# Patient Record
Sex: FEMALE | Race: WHITE | URBAN - METROPOLITAN AREA
[De-identification: names, ages, dates, MRNs, and addresses within clinical notes are randomized per-mention and may not be internally consistent; named-entity substitution may affect disease eponyms.]

---

## 2023-04-14 ENCOUNTER — OFFICE VISIT (OUTPATIENT)
Dept: URBAN - METROPOLITAN AREA CLINIC 87 | Facility: CLINIC | Age: 68
End: 2023-04-14
Payer: MEDICARE

## 2023-04-14 DIAGNOSIS — H27.01 APHAKIA, RIGHT EYE: ICD-10-CM

## 2023-04-14 DIAGNOSIS — H59.021 CATARACT (LENS) FRAGMT IN EYE FOL CATARACT SURGERY, R EYE: Primary | ICD-10-CM

## 2023-04-14 PROCEDURE — 99204 OFFICE O/P NEW MOD 45 MIN: CPT | Performed by: OPHTHALMOLOGY

## 2023-04-14 RX ORDER — OFLOXACIN 3 MG/ML
0.3 % SOLUTION/ DROPS OPHTHALMIC
Qty: 5 | Refills: 2 | Status: ACTIVE
Start: 2023-04-14

## 2023-04-14 RX ORDER — PREDNISOLONE ACETATE 10 MG/ML
1 % SUSPENSION/ DROPS OPHTHALMIC
Qty: 5 | Refills: 2 | Status: ACTIVE
Start: 2023-04-14

## 2023-04-14 ASSESSMENT — INTRAOCULAR PRESSURE
OS: 13
OD: 29

## 2023-04-14 NOTE — IMPRESSION/PLAN
Impression: Cataract (lens) fragmt in eye fol cataract surgery, r eye: H59.021. Right. OCT: 
OD: wnl OS: lamellar hole Plan: Examination reveals a retained piece of lens material in the vitreous cavity after cataract surgery. Given the quantity and potential for significant inflammation and elevated intraocular pressure, surgery is recommended. We discussed the risks, benefits, indications, and alternatives to vitrectomy and lensectomy. The risks include, but are not limited to infection, retinal tear or detachment, glaucoma, hemorrhage, loss of eye and blindness, chronic CME, and need for additional surgery. The patient understands that with vitrectomy, the vision can improve, but sometimes does not improve fully and sometimes the vision does not improve at all. Also, it can take months to years for the vision to improve. The patient elects to proceed with surgery. PLAN: 25g PPV, PPL, scleral fixated IOL OD (45 min, within 1 week) -Need original IOL calcs from [facility] for C/ Campos Grimes

## 2023-04-19 ENCOUNTER — POST-OPERATIVE VISIT (OUTPATIENT)
Dept: URBAN - METROPOLITAN AREA CLINIC 7 | Facility: CLINIC | Age: 68
End: 2023-04-19
Payer: MEDICARE

## 2023-04-19 DIAGNOSIS — H59.021 CATARACT (LENS) FRAGMT IN EYE FOL CATARACT SURGERY, R EYE: Primary | ICD-10-CM

## 2023-04-19 PROCEDURE — 99024 POSTOP FOLLOW-UP VISIT: CPT | Performed by: OPHTHALMOLOGY

## 2023-04-19 RX ORDER — ERYTHROMYCIN 5 MG/G
OINTMENT OPHTHALMIC
Qty: 5 | Refills: 1 | Status: ACTIVE
Start: 2023-04-19

## 2023-04-19 ASSESSMENT — INTRAOCULAR PRESSURE
OD: 39
OS: 14

## 2023-04-19 NOTE — IMPRESSION/PLAN
Impression: S/P 25g PPV, PPL, scleral fixated IOL OD - 1 Day. Cataract (lens) fragmt in eye fol cataract surgery, r eye  H59.021. Plan: PF/Oflox QID OD Cont Brimonidine BID OD Emycin qhs 

RTC 1 week POS DFE OD

## 2023-04-28 ENCOUNTER — POST-OPERATIVE VISIT (OUTPATIENT)
Dept: URBAN - METROPOLITAN AREA CLINIC 87 | Facility: CLINIC | Age: 68
End: 2023-04-28
Payer: MEDICARE

## 2023-04-28 DIAGNOSIS — H59.021 CATARACT (LENS) FRAGMT IN EYE FOL CATARACT SURGERY, R EYE: Primary | ICD-10-CM

## 2023-04-28 PROCEDURE — 99024 POSTOP FOLLOW-UP VISIT: CPT | Performed by: OPHTHALMOLOGY

## 2023-04-28 ASSESSMENT — INTRAOCULAR PRESSURE
OD: 22
OS: 18

## 2023-04-28 NOTE — IMPRESSION/PLAN
Impression: S/P Status post 25g PPV, PPL, scleral fixated IOL OD - 10 Days. Cataract (lens) fragmt in eye fol cataract surgery, r eye  H59.021. Plan: Continue PF QID. Stop Oflox.  Continue Brimonidine BID

RTC 1 month POS DFE/OCT OD

## 2023-06-16 ENCOUNTER — POST-OPERATIVE VISIT (OUTPATIENT)
Dept: URBAN - METROPOLITAN AREA CLINIC 86 | Facility: CLINIC | Age: 68
End: 2023-06-16
Payer: MEDICARE

## 2023-06-16 DIAGNOSIS — H59.021 CATARACT (LENS) FRAGMT IN EYE FOL CATARACT SURGERY, R EYE: Primary | ICD-10-CM

## 2023-06-16 PROCEDURE — 99024 POSTOP FOLLOW-UP VISIT: CPT | Performed by: OPHTHALMOLOGY

## 2023-06-16 ASSESSMENT — INTRAOCULAR PRESSURE
OD: 42
OS: 21

## 2023-06-16 NOTE — IMPRESSION/PLAN
Impression: S/P 25g PPV, PPL, scleral fixated IOL OD - 59 Days. Cataract (lens) fragmt in eye fol cataract surgery, r eye  H59.021. OCT:
OD: wnl OS: ERM, lamellar hole Plan: IOP high today at 40. Pt stopped drops on Monday. Rec restart Brimonidine BID OD Follow up with [address] [place] [address] in 2-3 weeks